# Patient Record
Sex: FEMALE | Race: BLACK OR AFRICAN AMERICAN
[De-identification: names, ages, dates, MRNs, and addresses within clinical notes are randomized per-mention and may not be internally consistent; named-entity substitution may affect disease eponyms.]

---

## 2019-04-17 ENCOUNTER — HOSPITAL ENCOUNTER (EMERGENCY)
Dept: HOSPITAL 25 - UCCORT | Age: 44
Discharge: HOME | End: 2019-04-17
Payer: COMMERCIAL

## 2019-04-17 VITALS — DIASTOLIC BLOOD PRESSURE: 99 MMHG | SYSTOLIC BLOOD PRESSURE: 149 MMHG

## 2019-04-17 DIAGNOSIS — F17.210: ICD-10-CM

## 2019-04-17 DIAGNOSIS — M25.512: Primary | ICD-10-CM

## 2019-04-17 PROCEDURE — G0463 HOSPITAL OUTPT CLINIC VISIT: HCPCS

## 2019-04-17 PROCEDURE — 99212 OFFICE O/P EST SF 10 MIN: CPT

## 2019-04-17 NOTE — UC
Shoulder Pain HPI





- HPI Summary


HPI Summary: 





Pt c/o gradual onset left shoulder pain that began 2 nights ago. Denies injury 

or repetitive movements. 





- History of Current Complaint


Chief Complaint: UCUpperExtremity


Stated Complaint: LEFT SHOULDER


Time Seen by Provider: 04/17/19 11:31


Hx Obtained From: Patient


Pregnant?: No


Onset/Duration: Sudden Onset, Lasting Days, Still Present, Worse Since - onset


Timing: Constant


Severity Initially: Mild


Severity Currently: Moderate


Pain Intensity: 10


Character: Dull, Aching, Stiffness


Aggravating Factor(s): Movement, Lifting, Flexion, Extension, Internal Rotation

, External Rotation, Abduction


Alleviating Factor(s): Rest


Associated Signs And Symptoms: Positive: Negative


Related History: Dominant Hand Right





- Risk Factors


Non-Orthopedic Risk Factor: Negative


DVT Risk Factors: Negative


Septic Arthritis Risk Factor: Negative





- Allergies/Home Medications


Allergies/Adverse Reactions: 


 Allergies











Allergy/AdvReac Type Severity Reaction Status Date / Time


 


No Known Allergies Allergy   Verified 04/17/19 11:09











Home Medications: 


 Home Medications





Docusate CAP* [Colace Cap*] 100 mg PO DAILY 04/17/19 [History Confirmed 04/17/19

]


Ibuprofen TAB* [Advil TAB*] 800 mg PO Q6H PRN 04/17/19 [History Confirmed 04/17/ 19]


Iron 18 mg PO DAILY 04/17/19 [History Confirmed 04/17/19]











PMH/Surg Hx/FS Hx/Imm Hx


Previously Healthy: Yes





- Surgical History


Surgical History: Yes


Surgery Procedure, Year, and Place: hysterectomy.  eptopic





- Family History


Known Family History: Positive: Cardiac Disease





- Social History


Occupation: Employed Full-time


Lives: With Family


Alcohol Use: Occasionally


Substance Use Type: Marijuana


Substance Use Comment - Amount & Last Used: nightly


Smoking Status (MU): Light Every Day Tobacco Smoker


Type: Cigarettes


Have You Smoked in the Last Year: Yes - and marijuana





- Immunization History


Vaccination Up to Date: No





Review of Systems


All Other Systems Reviewed And Are Negative: Yes


Constitutional: Positive: Negative


Skin: Positive: Negative


Eyes: Positive: Negative


ENT: Positive: Negative


Respiratory: Positive: Negative


Cardiovascular: Positive: Negative


Gastrointestinal: Positive: Negative


Genitourinary: Positive: Negative


Motor: Positive: Decreased ROM - left shoulder


Neurovascular: Positive: Negative


Musculoskeletal: Positive: Arthralgia, Decreased ROM, Myalgia


Neurological: Positive: Negative


Psychological: Positive: Negative


Is Patient Immunocompromised?: No





Physical Exam


Triage Information Reviewed: Yes


Appearance: Pain Distress - with movement of left shoulder


Vital Signs: 


 Initial Vital Signs











Temp  97.7 F   04/17/19 11:11


 


Pulse  73   04/17/19 11:11


 


Resp  15   04/17/19 11:11


 


BP  149/99   04/17/19 11:11


 


Pulse Ox  99   04/17/19 11:11











Vital Signs Reviewed: Yes


Eye Exam: Normal


ENT: Positive: Hearing grossly normal


Dental Exam: Normal


Neck exam: Normal


Respiratory: Positive: No respiratory distress


Musculoskeletal: Positive: Strength Limited @, ROM Limited @


Neurological Exam: Normal


Psychological Exam: Normal


Skin Exam: Normal





Shoulder Course/Dx





- Differential Dx/Diagnosis


Differential Diagnosis/HQI/PQRI: Arthritis, Bursitis


Provider Diagnosis: 


 Left shoulder pain








Discharge





- Sign-Out/Discharge


Documenting (check all that apply): Patient Departure


All imaging exams completed and their final reports reviewed: No Studies





- Discharge Plan


Condition: Stable


Disposition: HOME


Prescriptions: 


Cyclobenzaprine TAB* [Flexeril 10 MG TAB*] 10 mg PO Q8H PRN #15 tab


 PRN Reason: Pain


predniSONE TAB* [Deltasone 10 MG TAB*] 30 mg PO DAILY #12 tab


Patient Education Materials:  Arthralgia (ED), Shoulder Pain (ED)


Referrals: 


[facility] [place] [facility] [Outside] - If Needed


Jay Odell MD [Medical Doctor] - 


No Primary Care Phys,NOPCP [Primary Care Provider] - 





- Billing Disposition and Condition


Condition: STABLE


Disposition: Home